# Patient Record
Sex: MALE | Race: WHITE | NOT HISPANIC OR LATINO | Employment: FULL TIME | URBAN - METROPOLITAN AREA
[De-identification: names, ages, dates, MRNs, and addresses within clinical notes are randomized per-mention and may not be internally consistent; named-entity substitution may affect disease eponyms.]

---

## 2022-08-23 ENCOUNTER — TELEPHONE (OUTPATIENT)
Dept: ORTHOPEDIC SURGERY | Facility: CLINIC | Age: 42
End: 2022-08-23

## 2022-08-23 NOTE — TELEPHONE ENCOUNTER
Caller: PATIENT     Relationship to patient: SELF     Best call back number: [phone]    Patient is needing: PATIENT CALLED STATING HE WAS CALLED REGARDING A REFERRAL AND GETTING SCHEDULED. HE STATES HE ALREADY SPOKE TO SOMEONE YESTERDAY AND IS SCHEDULED FOR TOMORROW AT 8:15AM. THERE IS NO APPOINTMENT ON FILE AND NO MESSAGES IS HIS CHART. REFERRAL COMMUNICATION SAYS SCHEDULE FOR Wednesday PER SANYA. ATTEMPTED TO WT BUT THERE WAS NO ANSWER. PLEASE CALL PATIENT TO DISCUSS.

## 2022-08-24 ENCOUNTER — OFFICE VISIT (OUTPATIENT)
Dept: ORTHOPEDIC SURGERY | Facility: CLINIC | Age: 42
End: 2022-08-24

## 2022-08-24 VITALS — WEIGHT: 269 LBS | BODY MASS INDEX: 37.66 KG/M2 | HEART RATE: 98 BPM | HEIGHT: 71 IN | OXYGEN SATURATION: 97 %

## 2022-08-24 DIAGNOSIS — S62.345A CLOSED NONDISPLACED FRACTURE OF BASE OF FOURTH METACARPAL BONE OF LEFT HAND, INITIAL ENCOUNTER: Primary | ICD-10-CM

## 2022-08-24 PROCEDURE — 99204 OFFICE O/P NEW MOD 45 MIN: CPT | Performed by: STUDENT IN AN ORGANIZED HEALTH CARE EDUCATION/TRAINING PROGRAM

## 2022-08-24 PROCEDURE — 26600 TREAT METACARPAL FRACTURE: CPT | Performed by: STUDENT IN AN ORGANIZED HEALTH CARE EDUCATION/TRAINING PROGRAM

## 2022-08-24 NOTE — PROGRESS NOTES
"Chief Complaint  Pain and Initial Evaluation of the Left Hand    Subjective          Noé Rodriguez presents to [facility] for   History of Present Illness    The patient presents here today for evaluation of the left hand. The patient punched something on 8/21/22. He was seen and evaluated with x-rays and placed into a splint. He is a . He has no other complaints.     No Known Allergies     Social History     Socioeconomic History   • Marital status: Unknown   Tobacco Use   • Smoking status: Former Smoker   • Smokeless tobacco: Never Used   Vaping Use   • Vaping Use: Every day   Substance and Sexual Activity   • Alcohol use: Not Currently        I reviewed the patient's chief complaint, history of present illness, review of systems, past medical history, surgical history, family history, social history, medications, and allergy list.     REVIEW OF SYSTEMS    Constitutional: Denies fevers, chills, weight loss  Cardiovascular: Denies chest pain, shortness of breath  Skin: Denies rashes, acute skin changes  Neurologic: Denies headache, loss of consciousness  MSK: Left hand pain      Objective   Vital Signs:   Pulse 98   Ht 180.3 cm (71\")   Wt 122 kg (269 lb)   SpO2 97%   BMI 37.52 kg/m²     Body mass index is 37.52 kg/m².    Physical Exam    General: Alert. No acute distress.   Left hand- splint removed. Abrasion over the 4th and 5th MCP joints that are superficial. Moderate swelling. Tender to the base of the 4th metacarpal. Non-tender over the snuff box. Stiffness with finger flexion. Full extension. No angular rotational deformities.  Neurovascular intact.    Orthopedic Injury Treatment    Date/Time: 8/24/2022 8:27 AM  Performed by: Benjy Cat MD  Authorized by: Benjy Cat MD   Injury location: hand  Location details: left hand  Pre-procedure neurovascular assessment: neurovascularly intact    Anesthesia:  Local anesthesia used: " no    Sedation:  Patient sedated: no    Immobilization: cast  Supplies used: cotton padding (FIBERGLASS)  Post-procedure neurovascular assessment: post-procedure neurovascularly intact  Patient tolerance: patient tolerated the procedure well with no immediate complications  Comments: Closed treatment was obtained and fiberglass cast was applied.  The patient tolerated the procedure without any complications. APPLIED BY KRISTEN SMYTH MA            Imaging Results (Most Recent)     None                   Assessment and Plan        XR Hand 3+ View Left    Result Date: 8/21/2022  Narrative: PROCEDURE: XR HAND 3+ VW LEFT  COMPARISON: None  INDICATIONS: punched a refrigerator, swelling over dorsum and 3rd/4th MCP joints  FINDINGS:  BONES: There is cortical irregularity at the base of the metacarpals on the lateral view there also appears to be cortical irregularity on the ulnar aspect of the hamate. SOFT TISSUES: Dorsal soft tissue swelling is present in the hand. EFFUSION: None visible.  OTHER: Negative.       Impression:  Cortical irregularity along the base of the metacarpals on the lateral view and along the ulnar aspect of the hamate consistent with fractures.      BOBBY GALLEGOS MD       Electronically Signed and Approved By: BOBBY GALLEGOS MD on 8/21/2022 at 15:33                Diagnoses and all orders for this visit:    1. Closed nondisplaced fracture of base of fourth metacarpal bone of left hand, initial encounter (Primary)        Discussed the treatment plan with the patient.  I reviewed the x-rays that were obtained previously with the patient. The patient was placed into an ulnar gutter cast today. Cast care reviewed. Work note given today with restrictions.       Will obtain X-Rays of Left hand in cast at next visit.     Call or return if worsening symptoms.    Scribed for Benjy Cat MD by Betsey Markham  08/24/2022   08:13 EDT         Follow Up   Return in about 1 week (around 8/31/2022).  Patient  was given instructions and counseling regarding his condition or for health maintenance advice. Please see specific information pulled into the AVS if appropriate.       I have personally performed the services described in this document as scribed by the above individual and it is both accurate and complete.     Benjy Cat MD  08/24/22  08:26 EDT

## 2022-08-31 ENCOUNTER — OFFICE VISIT (OUTPATIENT)
Dept: ORTHOPEDIC SURGERY | Facility: CLINIC | Age: 42
End: 2022-08-31

## 2022-08-31 VITALS — HEART RATE: 90 BPM | OXYGEN SATURATION: 97 % | HEIGHT: 71 IN | WEIGHT: 271 LBS | BODY MASS INDEX: 37.94 KG/M2

## 2022-08-31 DIAGNOSIS — S62.345A CLOSED NONDISPLACED FRACTURE OF BASE OF FOURTH METACARPAL BONE OF LEFT HAND, INITIAL ENCOUNTER: Primary | ICD-10-CM

## 2022-08-31 PROCEDURE — 99024 POSTOP FOLLOW-UP VISIT: CPT | Performed by: STUDENT IN AN ORGANIZED HEALTH CARE EDUCATION/TRAINING PROGRAM

## 2022-08-31 NOTE — PROGRESS NOTES
"Chief Complaint  Follow-up and Pain of the Left Hand    Subjective          Noé Rodriguez presents to [facility] for   History of Present Illness    The patient presents here today for follow up evaluation of the left hand. The patient punched something on 8/21/22 and sustained a 4th metacarpal fracture. He was initially placed into a cast but states the cast was to tight and it was removed and he placed into an exoskeleton brace. He has no other complaints.  No Known Allergies     Social History     Socioeconomic History   • Marital status: Unknown   Tobacco Use   • Smoking status: Former Smoker   • Smokeless tobacco: Never Used   Vaping Use   • Vaping Use: Every day   Substance and Sexual Activity   • Alcohol use: Not Currently        I reviewed the patient's chief complaint, history of present illness, review of systems, past medical history, surgical history, family history, social history, medications, and allergy list.     REVIEW OF SYSTEMS    Constitutional: Denies fevers, chills, weight loss  Cardiovascular: Denies chest pain, shortness of breath  Skin: Denies rashes, acute skin changes  Neurologic: Denies headache, loss of consciousness  MSK: Left hand pain      Objective   Vital Signs:   Pulse 90   Ht 180.3 cm (71\")   Wt 123 kg (271 lb)   SpO2 97%   BMI 37.80 kg/m²     Body mass index is 37.8 kg/m².    Physical Exam    General: Alert. No acute distress.   Left hand- Moderate swelling. No wounds. Neurovascularly intact. Mild tenderness at the base of the 4th metacarpal. No pain with wrist motion. Full extension of the fingers. Stiffness with Flexion. No rotational deformity. Sensation intact to light touch median, radial, ulnar nerve. Positive AIN, PIN, ulnar nerve intact motor function. Palpable radial pulse.     Procedures    Imaging Results (Most Recent)     Procedure Component Value Units Date/Time    XR Hand 3+ View Left [[id number]] Resulted: 08/31/22 1141     " Updated: 08/31/22 1142    Narrative:      Indications: Follow-up left fourth metacarpal fracture    Views: AP and lateral left hand    Findings: Fourth metacarpal base fracture again seen and appears stable.    CMC joints are well aligned.  No additional fractures noted.    Comparative Data: Comparative data found and reviewed today                   Assessment and Plan        XR Hand 3+ View Left    Result Date: 8/31/2022  Narrative: Indications: Follow-up left fourth metacarpal fracture Views: AP and lateral left hand Findings: Fourth metacarpal base fracture again seen and appears stable.  CMC joints are well aligned.  No additional fractures noted. Comparative Data: Comparative data found and reviewed today    XR Hand 3+ View Left    Result Date: 8/21/2022  Narrative: PROCEDURE: XR HAND 3+ VW LEFT  COMPARISON: None  INDICATIONS: punched a refrigerator, swelling over dorsum and 3rd/4th MCP joints  FINDINGS:  BONES: There is cortical irregularity at the base of the metacarpals on the lateral view there also appears to be cortical irregularity on the ulnar aspect of the hamate. SOFT TISSUES: Dorsal soft tissue swelling is present in the hand. EFFUSION: None visible.  OTHER: Negative.       Impression:  Cortical irregularity along the base of the metacarpals on the lateral view and along the ulnar aspect of the hamate consistent with fractures.      BOBBY GALLEGOS MD       Electronically Signed and Approved By: BOBBY GALLEGOS MD on 8/21/2022 at 15:33                Diagnoses and all orders for this visit:    1. Closed nondisplaced fracture of base of fourth metacarpal bone of left hand, initial encounter (Primary)  -     XR Hand 3+ View Left        Discussed the treatment plan with the patient.  I reviewed the x-rays that were obtained today with the patient. Plan to continue exo-skeleton brace. He can work on gentle ROM at home. No lifting.       Will obtain X-Rays of left hand at next visit.     Call or return  if worsening symptoms.    Scribed for Benjy Cat MD by Betsey Markham  08/31/2022   08:28 EDT         Follow Up   Return in about 3 weeks (around 9/21/2022).  Patient was given instructions and counseling regarding his condition or for health maintenance advice. Please see specific information pulled into the AVS if appropriate.       I have personally performed the services described in this document as scribed by the above individual and it is both accurate and complete.     Benjy Cat MD  08/31/22  13:01 EDT

## 2022-09-21 ENCOUNTER — OFFICE VISIT (OUTPATIENT)
Dept: ORTHOPEDIC SURGERY | Facility: CLINIC | Age: 42
End: 2022-09-21

## 2022-09-21 VITALS — BODY MASS INDEX: 37.94 KG/M2 | HEART RATE: 86 BPM | OXYGEN SATURATION: 97 % | WEIGHT: 271 LBS | HEIGHT: 71 IN

## 2022-09-21 DIAGNOSIS — S62.345D CLOSED NONDISPLACED FRACTURE OF BASE OF FOURTH METACARPAL BONE OF LEFT HAND WITH ROUTINE HEALING, SUBSEQUENT ENCOUNTER: Primary | ICD-10-CM

## 2022-09-21 PROCEDURE — 99024 POSTOP FOLLOW-UP VISIT: CPT | Performed by: STUDENT IN AN ORGANIZED HEALTH CARE EDUCATION/TRAINING PROGRAM

## 2022-09-21 NOTE — PROGRESS NOTES
"Chief Complaint  Pain and Follow-up of the Left Hand    Subjective          Noé Rodriguez presents to [facility] for   History of Present Illness    The patient presents here today for follow up evaluation of the left hand. The patient punched something on 8/21/22 and sustained a 4th metacarpal fracture. He was initially placed into a cast but states the cast was to tight and it was removed and he placed into an exoskeleton brace. He has no other complaints. He reports his swelling and pain has decreased. He is overall doing well.     No Known Allergies     Social History     Socioeconomic History   • Marital status: Unknown   Tobacco Use   • Smoking status: Former Smoker   • Smokeless tobacco: Never Used   Vaping Use   • Vaping Use: Every day   Substance and Sexual Activity   • Alcohol use: Not Currently        I reviewed the patient's chief complaint, history of present illness, review of systems, past medical history, surgical history, family history, social history, medications, and allergy list.     REVIEW OF SYSTEMS    Constitutional: Denies fevers, chills, weight loss  Cardiovascular: Denies chest pain, shortness of breath  Skin: Denies rashes, acute skin changes  Neurologic: Denies headache, loss of consciousness  MSK: Left hand pain      Objective   Vital Signs:   Pulse 86   Ht 180.3 cm (71\")   Wt 123 kg (271 lb)   SpO2 97%   BMI 37.80 kg/m²     Body mass index is 37.8 kg/m².    Physical Exam    General: Alert. No acute distress.   Left hand- mild swelling over the base of 4th metacarpal. Mild tenderness. Full extension. No angular deformity. Flexion to the palm with assistance, No angular deformity. Neurovascularly intact. 45 wrist extension 55 wrist flexion. Good capillary refill.     Procedures    Imaging Results (Most Recent)     Procedure Component Value Units Date/Time    XR Hand 2 View Left [[id number]] Resulted: 09/21/22 1404     Updated: 09/21/22 1405    " Narrative:      Indications: Follow-up left fourth metacarpal fracture    Views: AP and lateral left hand    Findings: Left fourth proximal metacarpal appears stable.  Callus forming   at the fracture site.  All joints well aligned.    Comparative Data: Comparative data found and reviewed today                     Assessment and Plan        XR Hand 2 View Left    Result Date: 9/21/2022  Narrative: Indications: Follow-up left fourth metacarpal fracture Views: AP and lateral left hand Findings: Left fourth proximal metacarpal appears stable.  Callus forming at the fracture site.  All joints well aligned. Comparative Data: Comparative data found and reviewed today     XR Hand 3+ View Left    Result Date: 8/31/2022  Narrative: Indications: Follow-up left fourth metacarpal fracture Views: AP and lateral left hand Findings: Fourth metacarpal base fracture again seen and appears stable.  CMC joints are well aligned.  No additional fractures noted. Comparative Data: Comparative data found and reviewed today       Diagnoses and all orders for this visit:    1. Closed nondisplaced fracture of base of fourth metacarpal bone of left hand with routine healing, subsequent encounter (Primary)  -     XR Hand 2 View Left  -     Ambulatory Referral to Occupational Therapy        Discussed the treatment plan with the patient.  I reviewed the x-rays that were obtained today with the patient. His fracture is healing. Order for occupational therapy given today. Wrist brace given today to briseida tape the fingers. Plan to continue exo-skeleton brace. Plan to remove to work on ROM.       Will obtain X-Rays of Left hand at next visit.     Call or return if worsening symptoms.    Scribed for Benjy Cat MD by Betsey Markham  09/21/2022   07:42 EDT         Follow Up   Return in about 2 weeks (around 10/5/2022).  Patient was given instructions and counseling regarding his condition or for health maintenance advice. Please see specific  information pulled into the AVS if appropriate.       I have personally performed the services described in this document as scribed by the above individual and it is both accurate and complete.     Benjy Cat MD  09/21/22  14:38 EDT

## 2022-10-04 NOTE — PROGRESS NOTES
"Chief Complaint  Pain and Follow-up of the Left Hand    Subjective          Noé Rodriguez presents to [facility] for   History of Present Illness    Noé Rodriguez presents today for a follow-up of his left hand.  Patient has a left fourth metacarpal fracture with initial injury 8/21/2022.  Today, patient states he is doing okay.  He has had 2 visits with formal occupational therapy.  He reports some improvements in his range of motion.  He still experiences some pain to the hand and wrist with motion.  He reports continued swelling.  He has not been wearing his brace or using briseida tape due to not performing any heavy lifting, pushing, pulling.  He denies new injuries.  Denies numbness or tingling.      No Known Allergies     Social History     Socioeconomic History   • Marital status: Unknown   Tobacco Use   • Smoking status: Former Smoker   • Smokeless tobacco: Never Used   Vaping Use   • Vaping Use: Every day   Substance and Sexual Activity   • Alcohol use: Not Currently        I reviewed the patient's chief complaint, history of present illness, review of systems, past medical history, surgical history, family history, social history, medications, and allergy list.     REVIEW OF SYSTEMS    Constitutional: Denies fevers, chills, weight loss  Cardiovascular: Denies chest pain, shortness of breath  Skin: Denies rashes, acute skin changes  Neurologic: Denies headache, loss of consciousness  MSK: Left hand pain      Objective   Vital Signs:   Pulse 68   Ht 180.3 cm (71\")   Wt 126 kg (278 lb)   SpO2 99%   BMI 38.77 kg/m²     Body mass index is 38.77 kg/m².    Physical Exam    General: Alert. No acute distress.   Left upper extremity: Swelling to the base of the first metacarpal region with tenderness on palpation.  Nontender to the remaining hand.  Full finger flexion.  Full finger extension.  Forearm soft. Wrist flexion 60.  Wrist extension 45.  No pain with ulnar or radial " deviation.  Palmar adduction of thumb intact.  Sensation intact to median, radial, and ulnar nerve distributions.  Palpable radial pulse.    Procedures    Imaging Results (Most Recent)     Procedure Component Value Units Date/Time    XR Hand 2 View Left [[id number]] Resulted: 10/05/22 0806     Updated: 10/05/22 0807    Narrative:      Indications: Follow-up left fourth metacarpal fracture    Views: AP and lateral left hand    Findings: Left fourth metacarpal fracture is again seen.  Fracture   alignment is stable.  All joints appear anatomically aligned.  No   additional fractures.    Comparative Data: Comparative data found and reviewed today.                   Assessment and Plan    Diagnoses and all orders for this visit:    1. Closed nondisplaced fracture of base of fourth metacarpal bone of left hand with routine healing, subsequent encounter (Primary)  -     XR Hand 2 View Left        Noé Rodriguez presents today for follow-up of his left fourth metacarpal fracture that we are treating nonoperatively with initial injury 8/21/2022.  X-rays reviewed with the patient today.  Patient will continue formal occupational therapy.  Continue with his home exercises well.  Use ice and elevation as needed for inflammation.  Wear buddy tape and wrist brace with activities.  Continue to avoid any heavy lifting, pushing, pulling.  Work note written today.  Patient will follow up in 3 weeks for reevaluation.  We will obtain x-rays left hand at next visit.        Call or return if symptoms worsen or patient has any concerns.   Will obtain X-Rays of left hand at next visit.         Follow Up   No follow-ups on file.  Patient was given instructions and counseling regarding his condition or for health maintenance advice. Please see specific information pulled into the AVS if appropriate.     Nancy Vergara PA-C  10/05/22  08:07 EDT

## 2022-10-05 ENCOUNTER — OFFICE VISIT (OUTPATIENT)
Dept: ORTHOPEDIC SURGERY | Facility: CLINIC | Age: 42
End: 2022-10-05

## 2022-10-05 VITALS — WEIGHT: 278 LBS | HEIGHT: 71 IN | BODY MASS INDEX: 38.92 KG/M2 | HEART RATE: 68 BPM | OXYGEN SATURATION: 99 %

## 2022-10-05 DIAGNOSIS — S62.345D CLOSED NONDISPLACED FRACTURE OF BASE OF FOURTH METACARPAL BONE OF LEFT HAND WITH ROUTINE HEALING, SUBSEQUENT ENCOUNTER: Primary | ICD-10-CM

## 2022-10-05 PROCEDURE — 99024 POSTOP FOLLOW-UP VISIT: CPT | Performed by: PHYSICIAN ASSISTANT

## 2022-10-26 ENCOUNTER — OFFICE VISIT (OUTPATIENT)
Dept: ORTHOPEDIC SURGERY | Facility: CLINIC | Age: 42
End: 2022-10-26

## 2022-10-26 VITALS — WEIGHT: 278 LBS | BODY MASS INDEX: 38.92 KG/M2 | HEIGHT: 71 IN

## 2022-10-26 DIAGNOSIS — S62.345D CLOSED NONDISPLACED FRACTURE OF BASE OF FOURTH METACARPAL BONE OF LEFT HAND WITH ROUTINE HEALING, SUBSEQUENT ENCOUNTER: Primary | ICD-10-CM

## 2022-10-26 PROCEDURE — 99213 OFFICE O/P EST LOW 20 MIN: CPT | Performed by: PHYSICIAN ASSISTANT

## 2022-10-26 NOTE — PROGRESS NOTES
"Chief Complaint  Follow-up of the Left Hand    Subjective          Noé Rodriguez presents to [facility] for   History of Present Illness    Noé Rodriguez presents today for a follow-up of his left hand.  Patient has a left fourth metacarpal fracture that we are treating nonoperatively.  Initial injury 8/21/2022.  Today, patient states he is doing well.  He reports improvements in his hand overall.  He is continue working with occupational therapy where he has noticed improvements in his range of motion.  He reports full finger range of motion but still experiences some stiffness with wrist extension.  He states that his pain and swelling have improved.  He has been working on strengthening exercises without complications.  He reports no new injuries.  Denies numbness or tingling.      No Known Allergies     Social History     Socioeconomic History   • Marital status: Unknown   Tobacco Use   • Smoking status: Former   • Smokeless tobacco: Never   Vaping Use   • Vaping Use: Every day   Substance and Sexual Activity   • Alcohol use: Not Currently        I reviewed the patient's chief complaint, history of present illness, review of systems, past medical history, surgical history, family history, social history, medications, and allergy list.     REVIEW OF SYSTEMS    Constitutional: Denies fevers, chills, weight loss  Cardiovascular: Denies chest pain, shortness of breath  Skin: Denies rashes, acute skin changes  Neurologic: Denies headache, loss of consciousness  MSK: Left hand pain      Objective   Vital Signs:   Ht 180.3 cm (71\")   Wt 126 kg (278 lb)   BMI 38.77 kg/m²     Body mass index is 38.77 kg/m².    Physical Exam    General: Alert. No acute distress.   Left upper extremity: Improved swelling.  Forearm soft.  Nontender to the distal radius or distal ulna.  Nontender to the hand.  Full finger flexion with no associated pain.  Full finger extension.  Active wrist range of " motion.  Wrist flexion 70.  Wrist extension 45.  No pain with radial or ulnar deviation.  Thumb opposition intact.  Palmar adduction of thumb intact.  Sensation intact to median, radial, and ulnar nerve distributions.  Palpable radial pulse.    Procedures    Imaging Results (Most Recent)     Procedure Component Value Units Date/Time    XR Hand 2 View Left [[id number]] Resulted: 10/26/22 0756     Updated: 10/26/22 0756    Narrative:      Indications: Follow-up left fourth metacarpal fracture    Views: AP and lateral left hand    Findings: Well-healed left fourth metacarpal fracture.  All joints are   anatomically aligned.  No additional fractures are seen.    Comparative Data: Comparative data found and reviewed today.                   Assessment and Plan    Diagnoses and all orders for this visit:    1. Closed nondisplaced fracture of base of fourth metacarpal bone of left hand with routine healing, subsequent encounter (Primary)  -     XR Hand 2 View Left        Noé Rodriguez presents today for follow-up of his left fourth metacarpal fracture that we are treating nonoperatively.  Initial injury 8/21/2022.  X-rays reviewed with the patient today. Continue with ice and elevation as needed.  Continue with occupational therapy as well as his home exercises. Work note written today.  Patient will follow-up as needed.    Call or return if symptoms worsen or patient has any concerns.         Follow Up   Return if symptoms worsen or fail to improve.  Patient was given instructions and counseling regarding his condition or for health maintenance advice. Please see specific information pulled into the AVS if appropriate.     Nancy Vergara PA-C  10/26/22  07:57 EDT

## 2023-01-16 ENCOUNTER — TRANSCRIBE ORDERS (OUTPATIENT)
Dept: ADMINISTRATIVE | Facility: HOSPITAL | Age: 43
End: 2023-01-16
Payer: COMMERCIAL

## 2023-01-16 DIAGNOSIS — E04.9 GOITER: Primary | ICD-10-CM

## 2023-01-20 ENCOUNTER — HOSPITAL ENCOUNTER (OUTPATIENT)
Dept: ULTRASOUND IMAGING | Facility: HOSPITAL | Age: 43
Discharge: HOME | End: 2023-01-20
Admitting: FAMILY MEDICINE
Payer: COMMERCIAL

## 2023-01-20 DIAGNOSIS — E04.9 GOITER: ICD-10-CM

## 2023-01-20 PROCEDURE — 76536 US EXAM OF HEAD AND NECK: CPT

## 2023-02-02 ENCOUNTER — TRANSCRIBE ORDERS (OUTPATIENT)
Dept: ADMINISTRATIVE | Facility: HOSPITAL | Age: 43
End: 2023-02-02
Payer: COMMERCIAL

## 2023-02-02 DIAGNOSIS — K11.8 OTHER DISEASES OF SALIVARY GLANDS: Primary | ICD-10-CM
